# Patient Record
Sex: FEMALE | Race: WHITE | NOT HISPANIC OR LATINO | Employment: OTHER | URBAN - METROPOLITAN AREA
[De-identification: names, ages, dates, MRNs, and addresses within clinical notes are randomized per-mention and may not be internally consistent; named-entity substitution may affect disease eponyms.]

---

## 2023-06-07 ENCOUNTER — HOSPITAL ENCOUNTER (EMERGENCY)
Facility: HOSPITAL | Age: 48
Discharge: HOME | End: 2023-06-07
Attending: EMERGENCY MEDICINE

## 2023-06-07 VITALS
SYSTOLIC BLOOD PRESSURE: 138 MMHG | HEART RATE: 72 BPM | DIASTOLIC BLOOD PRESSURE: 91 MMHG | WEIGHT: 164.46 LBS | RESPIRATION RATE: 24 BRPM | OXYGEN SATURATION: 100 % | TEMPERATURE: 98.2 F

## 2023-06-07 DIAGNOSIS — Z98.811 DENTAL FILLING STATUS: Primary | ICD-10-CM

## 2023-06-07 DIAGNOSIS — K08.89 PAIN, DENTAL: ICD-10-CM

## 2023-06-07 PROCEDURE — 99282 EMERGENCY DEPT VISIT SF MDM: CPT

## 2023-06-08 NOTE — ED PROVIDER NOTES
History  Chief Complaint   Patient presents with   • Dental Pain     Temporary dental filling fell out, now with excruciating pain  Also endorses n/v  No relief w/ rx NSAIDs  25-year-old female presents to the emergency room after she states a temporary filling fell out around 1900 hrs  Patient notes pain in the area and is feeling and difficulty with tolerating oral intake secondary to worsening of the pain  Patient attempted to contact her dentist however they were unavailable  Patient had seen her dentist and they had initiated a root canal however they could not finish it so the patient was referred to a periodontist     On clinical examination, there was a missing filling that is obvious at the right upper first molar  I had an extensive discussion with the patient about options regarding treatment  I emphasized to the patient that I am not a dentist and any treatment would be limited  After extensive discussion with the patient, patient consented to placement of temporary filling  I discussed that we cannot mold the filling and there will likely be issues with bite following the replacement  The area was cleansed with sterile normal saline  Dental rolls were placed in the upper and lower gum spaces  The area was allowed to dry which was further enhanced with blowing oxygen onto the tooth as tolerated by the patient  Lime light enhanced light cured cavity liner was placed in the cavity and allowed to cure with the UV cure light with appropriate response  Patient notes excellent response with I discussed the temporary nature of this with the patient  I discussed need for contact dentistry tomorrow for a more adequate repair  Discussed and emphasized return precautions  None       Past Medical History:   Diagnosis Date   • Hypothyroid        History reviewed  No pertinent surgical history  History reviewed  No pertinent family history    I have reviewed and agree with the history as documented  E-Cigarette/Vaping     E-Cigarette/Vaping Substances     Social History     Tobacco Use   • Smoking status: Never   • Smokeless tobacco: Never   Substance Use Topics   • Alcohol use: Not Currently   • Drug use: Not Currently       Review of Systems    Physical Exam  Physical Exam  HENT:      Mouth/Throat:      Dentition: Abnormal dentition  Dental tenderness present  Eyes:      Conjunctiva/sclera: Conjunctivae normal    Neurological:      Mental Status: She is alert  Vital Signs  ED Triage Vitals [06/07/23 2152]   Temperature Pulse Respirations Blood Pressure SpO2   98 2 °F (36 8 °C) 72 (!) 24 138/91 100 %      Temp Source Heart Rate Source Patient Position - Orthostatic VS BP Location FiO2 (%)   Oral Monitor Sitting Right arm --      Pain Score       --           Vitals:    06/07/23 2152   BP: 138/91   Pulse: 72   Patient Position - Orthostatic VS: Sitting         Visual Acuity      ED Medications  Medications - No data to display    Diagnostic Studies  Results Reviewed     None                 No orders to display              Procedures  Procedures         ED Course                                             MDM    Disposition  Final diagnoses:   Dental filling status   Pain, dental     Time reflects when diagnosis was documented in both MDM as applicable and the Disposition within this note     Time User Action Codes Description Comment    6/7/2023 10:24 PM Helena Clark [G52 [id number]] Dental filling status     6/7/2023 10:24 PM Helena Clark [K08 [id number]] Pain, dental       ED Disposition     ED Disposition   Discharge    Condition   Stable    Date/Time   Wed Jun 7, 2023 10:24 PM    Comment   Katey Yusuf Papiomitis discharge to home/self care                 Follow-up Information     Follow up With Specialties Details Why Contact Info Additional Information    Dentist  Schedule an appointment as soon as possible for a visit  Call in the morning to discuss continued management  [address] [facility] Emergency Department Emergency Medicine Go to  If symptoms worsen [address] [facility] [address] [facility] Emergency Department, [address] [place] [address], [facility]          There are no discharge medications for this patient  No discharge procedures on file      PDMP Review     None          ED Provider  Electronically Signed by           Lola Wheeler MD  06/08/23 0722

## 2024-05-28 ENCOUNTER — TELEPHONE (OUTPATIENT)
Age: 49
End: 2024-05-28

## 2024-07-15 ENCOUNTER — TELEPHONE (OUTPATIENT)
Age: 49
End: 2024-07-15

## 2024-07-15 NOTE — TELEPHONE ENCOUNTER
Patient's spouse called on patient's behalf. Patient was also present for call. Wondering when patient's next appointment is scheduled for.     RN advised that next appointment is with and endocrinology provider with [facility]. Patient had an appointment scheduled 06/11/2024, listed as no show.     Patient and spouse verbalized understanding, patient states she didn't know she had the appointment.     RN attempted to schedule patient for appointment.     Patient declined states she will wait for endo provider to order the tests.

## 2024-10-25 ENCOUNTER — TELEPHONE (OUTPATIENT)
Age: 49
End: 2024-10-25

## 2024-10-25 NOTE — TELEPHONE ENCOUNTER
Attempted to reach Sheri. Not able to leave a message. LVM on emergency contact () confirming appt on 10/29/24 at 3:20. Also asked them to bring any records documenting hypothyroidism. Ie. Labs, records from previous doctor.

## 2024-10-29 ENCOUNTER — OFFICE VISIT (OUTPATIENT)
Age: 49
End: 2024-10-29

## 2024-10-29 VITALS
TEMPERATURE: 97.8 F | HEART RATE: 58 BPM | WEIGHT: 176 LBS | DIASTOLIC BLOOD PRESSURE: 68 MMHG | HEIGHT: 64 IN | OXYGEN SATURATION: 99 % | BODY MASS INDEX: 30.05 KG/M2 | SYSTOLIC BLOOD PRESSURE: 116 MMHG

## 2024-10-29 DIAGNOSIS — E03.9 ACQUIRED HYPOTHYROIDISM: Primary | ICD-10-CM

## 2024-10-29 PROCEDURE — 99244 OFF/OP CNSLTJ NEW/EST MOD 40: CPT | Performed by: STUDENT IN AN ORGANIZED HEALTH CARE EDUCATION/TRAINING PROGRAM

## 2024-10-29 NOTE — PROGRESS NOTES
"Ambulatory Visit  Name: Minda Sanchez      : 1975      MRN: [id number]  Encounter Provider: Tank Chahal MD  Encounter Date: 10/29/2024   Encounter department: [facility]    Assessment & Plan  Acquired hypothyroidism  History of hypothyroidism for many years, previously on LT4 replacement therapy with Synthroid 125 mcg daily which she has a stop taking it for almost a year, and she has been experiencing clinical symptoms of hypothyroidism, no recent TFT done.  We did emphasize importance of compliance with LT4 replacement therapy, to avoid symptoms and complication associated with hypothyroidism.  I ordered TSH and free T4, she was advised to hold high-dose biotin 3 days before blood work.  Given severe fatigue, and history of syncope, I ordered morning cortisol as well.  Will decide regarding LT4 replacement therapy dose pending above result.        Orders:    T4, free    TSH, 3rd generation    Cortisol Level, AM Specimen; Future      History of Present Illness     Minda Sanchez is a 49 y.o. female who presents to establish care with us.    She has history of hypothyroidism for more than 25 years,   She was on synthroid 125 mcg daily which she has discontinued approximately 1 year ago.    Since she stopped taking LT4 replacement therapy she has noticed weight gain, she cannot specify how much, she reports no appetite changes, she walks 5 days a week 8000-11,000 steps a day, she follows healthy diet and avoiding fast food or junk food.    She reports feeling fatigue, lack of energy, \"I needs to drag myself\".   Endorses brittle nails, dry skin and hair loss.    Started having mensturation cycles irregular and becoming less often.    She reports history of constipation,        History obtained from : patient  Review of Systems:  Negative except as mentioned in HPI.    Medical History Reviewed by provider this encounter:       No " current outpatient medications on file prior to visit.     No current facility-administered medications on file prior to visit.          Objective     There were no vitals taken for this visit.    Physical Exam  Vitals and nursing note reviewed.   Constitutional:       General: She is not in acute distress.     Appearance: She is well-developed.   HENT:      Head: Normocephalic and atraumatic.   Neck:      Thyroid: No thyroid mass, thyromegaly or thyroid tenderness.   Cardiovascular:      Rate and Rhythm: Normal rate and regular rhythm.   Pulmonary:      Effort: Pulmonary effort is normal. No respiratory distress.   Abdominal:      Palpations: Abdomen is soft.   Musculoskeletal:         General: No swelling.      Cervical back: Neck supple.   Skin:     General: Skin is warm and dry.   Neurological:      Mental Status: She is alert.       Administrative Statements   I have spent a total time of 35 minutes in caring for this patient on the day of the visit/encounter including Prognosis, Risks and benefits of tx options, Patient and family education, Importance of tx compliance, Impressions, Counseling / Coordination of care, Documenting in the medical record, Reviewing / ordering tests, medicine, procedures  , and Obtaining or reviewing history  .

## 2024-10-29 NOTE — ASSESSMENT & PLAN NOTE
History of hypothyroidism for many years, previously on LT4 replacement therapy with Synthroid 125 mcg daily which she has a stop taking it for almost a year, and she has been experiencing clinical symptoms of hypothyroidism, no recent TFT done.  We did emphasize importance of compliance with LT4 replacement therapy, to avoid symptoms and complication associated with hypothyroidism.  I ordered TSH and free T4, she was advised to hold high-dose biotin 3 days before blood work.  Given severe fatigue, and history of syncope, I ordered morning cortisol as well.  Will decide regarding LT4 replacement therapy dose pending above result.        Orders:    T4, free    TSH, 3rd generation    Cortisol Level, AM Specimen; Future

## 2024-11-01 ENCOUNTER — APPOINTMENT (OUTPATIENT)
Dept: LAB | Facility: HOSPITAL | Age: 49
End: 2024-11-01

## 2024-11-01 ENCOUNTER — TELEPHONE (OUTPATIENT)
Age: 49
End: 2024-11-01

## 2024-11-01 DIAGNOSIS — E03.9 ACQUIRED HYPOTHYROIDISM: ICD-10-CM

## 2024-11-01 LAB
CORTIS AM PEAK SERPL-MCNC: 6.3 UG/DL (ref 6.7–22.6)
T4 FREE SERPL-MCNC: 0.79 NG/DL (ref 0.61–1.12)
TSH SERPL DL<=0.05 MIU/L-ACNC: 5.47 UIU/ML (ref 0.45–4.5)

## 2024-11-01 PROCEDURE — 84439 ASSAY OF FREE THYROXINE: CPT | Performed by: STUDENT IN AN ORGANIZED HEALTH CARE EDUCATION/TRAINING PROGRAM

## 2024-11-01 PROCEDURE — 84443 ASSAY THYROID STIM HORMONE: CPT | Performed by: STUDENT IN AN ORGANIZED HEALTH CARE EDUCATION/TRAINING PROGRAM

## 2024-11-01 PROCEDURE — 82533 TOTAL CORTISOL: CPT

## 2024-11-01 PROCEDURE — 36415 COLL VENOUS BLD VENIPUNCTURE: CPT

## 2024-11-01 NOTE — TELEPHONE ENCOUNTER
----- Message from Tank Chahal MD sent at 11/1/2024  3:04 PM EDT -----  I reviewed lab results, which showed free T4 is normal, TSH is slightly above goal, although her morning cortisol is low as well, could you please asked patient if she received any type of steroids including oral or injection recently.

## 2024-11-01 NOTE — TELEPHONE ENCOUNTER
LVM for Critina.  Relayed Dr. Chahal's message regarding lab results.  Asked her to give us a return call to let us know whether or not she had been and took any type of steroid (oral,injection or medication).

## 2024-11-04 DIAGNOSIS — E27.49 DECREASED CORTISOL LEVEL (HCC): Primary | ICD-10-CM

## 2024-11-04 NOTE — TELEPHONE ENCOUNTER
Spoke to Minda.  Relayed Dr. Chahal's message regarding the ACTH test to be done at [facility].    Gave her the number in case she does not hear from them soon.

## 2024-11-04 NOTE — TELEPHONE ENCOUNTER
Patient returning call and states she has not taken any type of steroids, oral or injection. Please advise, thank you.

## 2024-11-05 NOTE — TELEPHONE ENCOUNTER
"Phone call from patient stating \"she called to schedule appt in the [facility], however was told that appt has to be scheduled by office\". Please scheduled appt ASAP at patient's request, and contact patient.   "

## 2024-11-05 NOTE — TELEPHONE ENCOUNTER
Called infusion center and scheduled appt for patient Thurs 11/7 at 7:30.    Once it is initially scheduled, patient can reschedule appt.    Called patient and 11/7 @ 7:30 is fine

## 2024-11-07 ENCOUNTER — HOSPITAL ENCOUNTER (OUTPATIENT)
Dept: INFUSION CENTER | Facility: CLINIC | Age: 49
Discharge: HOME | End: 2024-11-07

## 2024-11-07 ENCOUNTER — TELEPHONE (OUTPATIENT)
Age: 49
End: 2024-11-07

## 2024-11-07 VITALS
HEART RATE: 65 BPM | RESPIRATION RATE: 18 BRPM | SYSTOLIC BLOOD PRESSURE: 106 MMHG | DIASTOLIC BLOOD PRESSURE: 73 MMHG | TEMPERATURE: 97.4 F

## 2024-11-07 DIAGNOSIS — E03.9 ACQUIRED HYPOTHYROIDISM: Primary | ICD-10-CM

## 2024-11-07 DIAGNOSIS — E27.49 DECREASED CORTISOL LEVEL (HCC): Primary | ICD-10-CM

## 2024-11-07 LAB
CORTIS SERPL-MCNC: 18 UG/DL
CORTIS SERPL-MCNC: 19.1 UG/DL
CORTIS SERPL-MCNC: 6.2 UG/DL

## 2024-11-07 PROCEDURE — 82024 ASSAY OF ACTH: CPT | Performed by: STUDENT IN AN ORGANIZED HEALTH CARE EDUCATION/TRAINING PROGRAM

## 2024-11-07 PROCEDURE — 82533 TOTAL CORTISOL: CPT | Performed by: STUDENT IN AN ORGANIZED HEALTH CARE EDUCATION/TRAINING PROGRAM

## 2024-11-07 RX ORDER — LEVOTHYROXINE SODIUM 75 MCG
75 TABLET ORAL DAILY
Qty: 90 TABLET | Refills: 0 | Status: SHIPPED | OUTPATIENT
Start: 2024-11-07 | End: 2025-02-05

## 2024-11-07 RX ADMIN — COSYNTROPIN 0.25 MG: 0.25 INJECTION, POWDER, LYOPHILIZED, FOR SOLUTION INTRAMUSCULAR; INTRAVENOUS at 08:27

## 2024-11-07 NOTE — TELEPHONE ENCOUNTER
"Relayed Dr. Chahal's message.    \"cortisol result came back normal and this rules out adrenal insufficiency as cause of fatigue, although as her TSH is elevated, I do recommend to resume Synthroid 75 mcg for now and to repeat thyroid test in 2 months to decide regarding adjusting the dose accordingly.\"    Call the office if she has any questions  "

## 2024-11-07 NOTE — TELEPHONE ENCOUNTER
----- Message from Tank Chahal MD sent at 11/7/2024  1:19 PM EST -----  Please let Minda know that her cortisol result came back normal and this rules out adrenal insufficiency as cause of fatigue, although as her TSH is elevated, I do recommend to resume Synthroid 75 mcg for now and to repeat thyroid test in 2 months to decide regarding adjusting the dose accordingly.

## 2024-11-07 NOTE — PROGRESS NOTES
Patient here for ACTH test.  Offers no complaints.  Left AC IV placed with positive blood return noted throughout treatment.  Labs drawn via peripheral IV.  PIV removed.  AVS declined.  No other appts at this time.  Walked out in stable condition.

## 2024-11-08 LAB — ACTH PLAS-MCNC: 11.9 PG/ML (ref 7.2–63.3)

## 2025-02-02 DIAGNOSIS — E03.9 ACQUIRED HYPOTHYROIDISM: ICD-10-CM

## 2025-02-03 RX ORDER — LEVOTHYROXINE SODIUM 75 MCG
75 TABLET ORAL DAILY
Qty: 90 TABLET | Refills: 1 | Status: SHIPPED | OUTPATIENT
Start: 2025-02-03

## 2025-02-04 ENCOUNTER — TELEPHONE (OUTPATIENT)
Age: 50
End: 2025-02-04

## 2025-02-04 DIAGNOSIS — E03.9 ACQUIRED HYPOTHYROIDISM: Primary | ICD-10-CM

## 2025-02-04 RX ORDER — LEVOTHYROXINE SODIUM 75 UG/1
75 TABLET ORAL DAILY
Qty: 12 TABLET | Refills: 0 | Status: SHIPPED | OUTPATIENT
Start: 2025-02-04 | End: 2025-02-16

## 2025-02-04 NOTE — TELEPHONE ENCOUNTER
Spoke to patient - advised her that Dr. Chahal sent 12 tablets to her pharmacy.  Will discuss at her appt in a couple of weeks

## 2025-02-04 NOTE — TELEPHONE ENCOUNTER
Patient calling in regards to her levothyroxine order. States the pharmacy is charging her over $100 for a 3 month supply. She states she currently has 2 Levothyroxine pills left and that she has appointment on 2/18 and is concerned that her levothyroxine dose is going to be changed, so she doesn't want to  the 90 day supply. She is asking if a 12 day supply of Levothyroxine 75mcg can be called into the [facility] pharmacy to hold her over until her appointment on 2/18 where new dose can be discussed. Please advise, patient is requesting a call back. Thank you

## 2025-02-17 ENCOUNTER — TELEPHONE (OUTPATIENT)
Age: 50
End: 2025-02-17

## 2025-02-17 NOTE — TELEPHONE ENCOUNTER
Patient cancelled follow up appt tomorrow due to having the flu. Did not reschedule with the person who took message.  She is requesting a refill of her levothyroxine 75mcg.    Called patient to schedule a follow up - no answer - lvm to call the office to schedule a follow up appt.  Advised her I would check with provider to see about her refill and that she needs to complete the labs that Dr. Chahal ordered for her in November.    Can a 30 day refill be sent until I can get her in with Nguyen?

## 2025-02-18 DIAGNOSIS — E03.9 ACQUIRED HYPOTHYROIDISM: ICD-10-CM

## 2025-02-18 RX ORDER — LEVOTHYROXINE SODIUM 75 MCG
75 TABLET ORAL DAILY
Qty: 30 TABLET | Refills: 0 | Status: SHIPPED | OUTPATIENT
Start: 2025-02-18

## 2025-03-04 ENCOUNTER — TELEPHONE (OUTPATIENT)
Age: 50
End: 2025-03-04

## 2025-03-22 DIAGNOSIS — E03.9 ACQUIRED HYPOTHYROIDISM: ICD-10-CM

## 2025-03-24 ENCOUNTER — TELEPHONE (OUTPATIENT)
Age: 50
End: 2025-03-24

## 2025-03-24 RX ORDER — LEVOTHYROXINE SODIUM 75 MCG
75 TABLET ORAL DAILY
Qty: 30 TABLET | Refills: 0 | Status: SHIPPED | OUTPATIENT
Start: 2025-03-24

## 2025-03-24 NOTE — TELEPHONE ENCOUNTER
Receive refill request from Pharmacy for Levothyroxine. Already received courtesy refill    Last OV was w/ Dr. Chahal in October 2024. Patient No Showed for last follow up appt 3/4/25.    Left voice mail for patient to call to schedule follow up a and she needed to complete her Thyroid labs that Dr. Chahal ordered in November.

## 2025-03-24 NOTE — TELEPHONE ENCOUNTER
Requested medication(s) are due for refill today: Yes  Patient has already received a courtesy refill: Yes  Other reason request has been forwarded to provider: No showed last appt - LVM that she needed to schedule a follow up appt. Refill?

## 2025-03-31 ENCOUNTER — APPOINTMENT (OUTPATIENT)
Dept: LAB | Facility: HOSPITAL | Age: 50
End: 2025-03-31
Payer: COMMERCIAL

## 2025-03-31 DIAGNOSIS — E03.9 PRIMARY HYPOTHYROIDISM: ICD-10-CM

## 2025-03-31 LAB
ALBUMIN SERPL BCG-MCNC: 4.5 G/DL (ref 3.5–5)
ALP SERPL-CCNC: 77 U/L (ref 34–104)
ALT SERPL W P-5'-P-CCNC: 13 U/L (ref 7–52)
ANION GAP SERPL CALCULATED.3IONS-SCNC: 6 MMOL/L (ref 4–13)
AST SERPL W P-5'-P-CCNC: 15 U/L (ref 13–39)
BASOPHILS # BLD AUTO: 0.04 THOUSANDS/ÂΜL (ref 0–0.1)
BASOPHILS NFR BLD AUTO: 1 % (ref 0–1)
BILIRUB SERPL-MCNC: 0.9 MG/DL (ref 0.2–1)
BUN SERPL-MCNC: 19 MG/DL (ref 5–25)
CALCIUM SERPL-MCNC: 9.1 MG/DL (ref 8.4–10.2)
CHLORIDE SERPL-SCNC: 107 MMOL/L (ref 96–108)
CHOLEST SERPL-MCNC: 158 MG/DL (ref ?–200)
CO2 SERPL-SCNC: 28 MMOL/L (ref 21–32)
CREAT SERPL-MCNC: 0.58 MG/DL (ref 0.6–1.3)
EOSINOPHIL # BLD AUTO: 0.14 THOUSAND/ÂΜL (ref 0–0.61)
EOSINOPHIL NFR BLD AUTO: 3 % (ref 0–6)
ERYTHROCYTE [DISTWIDTH] IN BLOOD BY AUTOMATED COUNT: 13.5 % (ref 11.6–15.1)
GFR SERPL CREATININE-BSD FRML MDRD: 107 ML/MIN/1.73SQ M
GLUCOSE P FAST SERPL-MCNC: 85 MG/DL (ref 65–99)
HCT VFR BLD AUTO: 39.5 % (ref 34.8–46.1)
HDLC SERPL-MCNC: 55 MG/DL
HGB BLD-MCNC: 12.4 G/DL (ref 11.5–15.4)
IMM GRANULOCYTES # BLD AUTO: 0.01 THOUSAND/UL (ref 0–0.2)
IMM GRANULOCYTES NFR BLD AUTO: 0 % (ref 0–2)
LDLC SERPL CALC-MCNC: 91 MG/DL (ref 0–100)
LYMPHOCYTES # BLD AUTO: 2.43 THOUSANDS/ÂΜL (ref 0.6–4.47)
LYMPHOCYTES NFR BLD AUTO: 45 % (ref 14–44)
MCH RBC QN AUTO: 28.4 PG (ref 26.8–34.3)
MCHC RBC AUTO-ENTMCNC: 31.4 G/DL (ref 31.4–37.4)
MCV RBC AUTO: 91 FL (ref 82–98)
MONOCYTES # BLD AUTO: 0.42 THOUSAND/ÂΜL (ref 0.17–1.22)
MONOCYTES NFR BLD AUTO: 8 % (ref 4–12)
NEUTROPHILS # BLD AUTO: 2.27 THOUSANDS/ÂΜL (ref 1.85–7.62)
NEUTS SEG NFR BLD AUTO: 43 % (ref 43–75)
NONHDLC SERPL-MCNC: 103 MG/DL
NRBC BLD AUTO-RTO: 0 /100 WBCS
PLATELET # BLD AUTO: 393 THOUSANDS/UL (ref 149–390)
PMV BLD AUTO: 10 FL (ref 8.9–12.7)
POTASSIUM SERPL-SCNC: 3.6 MMOL/L (ref 3.5–5.3)
PROT SERPL-MCNC: 7.4 G/DL (ref 6.4–8.4)
RBC # BLD AUTO: 4.36 MILLION/UL (ref 3.81–5.12)
SODIUM SERPL-SCNC: 141 MMOL/L (ref 135–147)
T4 FREE SERPL-MCNC: 1.05 NG/DL (ref 0.61–1.12)
TRIGL SERPL-MCNC: 59 MG/DL (ref ?–150)
TSH SERPL DL<=0.05 MIU/L-ACNC: 1.44 UIU/ML (ref 0.45–4.5)
WBC # BLD AUTO: 5.31 THOUSAND/UL (ref 4.31–10.16)

## 2025-03-31 PROCEDURE — 80061 LIPID PANEL: CPT

## 2025-03-31 PROCEDURE — 86376 MICROSOMAL ANTIBODY EACH: CPT

## 2025-03-31 PROCEDURE — 84439 ASSAY OF FREE THYROXINE: CPT

## 2025-03-31 PROCEDURE — 36415 COLL VENOUS BLD VENIPUNCTURE: CPT

## 2025-03-31 PROCEDURE — 80053 COMPREHEN METABOLIC PANEL: CPT

## 2025-03-31 PROCEDURE — 85025 COMPLETE CBC W/AUTO DIFF WBC: CPT

## 2025-03-31 PROCEDURE — 84443 ASSAY THYROID STIM HORMONE: CPT

## 2025-04-01 LAB — THYROPEROXIDASE AB SERPL-ACNC: 16 IU/ML (ref 0–34)
